# Patient Record
Sex: FEMALE | Race: BLACK OR AFRICAN AMERICAN | NOT HISPANIC OR LATINO | Employment: FULL TIME | URBAN - METROPOLITAN AREA
[De-identification: names, ages, dates, MRNs, and addresses within clinical notes are randomized per-mention and may not be internally consistent; named-entity substitution may affect disease eponyms.]

---

## 2023-10-18 ENCOUNTER — APPOINTMENT (OUTPATIENT)
Dept: CARDIOLOGY | Facility: CLINIC | Age: 63
End: 2023-10-18
Payer: COMMERCIAL

## 2023-10-18 LAB
NON-UH HIE A/G RATIO: 1.3
NON-UH HIE ALB: 3.7 G/DL (ref 3.4–5)
NON-UH HIE ALK PHOS: 76 UNIT/L (ref 45–117)
NON-UH HIE BILIRUBIN, TOTAL: 0.6 MG/DL (ref 0.3–1.2)
NON-UH HIE BUN/CREAT RATIO: 15.5
NON-UH HIE BUN: 17 MG/DL (ref 9–23)
NON-UH HIE CALCIUM: 9.6 MG/DL (ref 8.7–10.4)
NON-UH HIE CALCULATED LDL CHOLESTEROL: 94 MG/DL (ref 60–130)
NON-UH HIE CALCULATED OSMOLALITY: 282 MOSM/KG (ref 275–295)
NON-UH HIE CHLORIDE: 105 MMOL/L (ref 98–107)
NON-UH HIE CHOLESTEROL: 205 MG/DL (ref 100–200)
NON-UH HIE CO2, VENOUS: 31 MMOL/L (ref 20–31)
NON-UH HIE CREATININE: 1.1 MG/DL (ref 0.5–0.8)
NON-UH HIE GFR AA: >60
NON-UH HIE GLOBULIN: 2.8 G/DL
NON-UH HIE GLOMERULAR FILTRATION RATE: 50 ML/MIN/1.73M?
NON-UH HIE GLUCOSE: 85 MG/DL (ref 74–106)
NON-UH HIE GOT: 22 UNIT/L (ref 15–37)
NON-UH HIE GPT: 14 UNIT/L (ref 10–49)
NON-UH HIE HDL CHOLESTEROL: 101 MG/DL (ref 40–60)
NON-UH HIE K: 3.8 MMOL/L (ref 3.5–5.1)
NON-UH HIE NA: 141 MMOL/L (ref 135–145)
NON-UH HIE TOTAL CHOL/HDL CHOL RATIO: 2
NON-UH HIE TOTAL PROTEIN: 6.5 G/DL (ref 5.7–8.2)
NON-UH HIE TRIGLYCERIDES: 49 MG/DL (ref 30–150)

## 2023-10-25 PROBLEM — I10 HYPERTENSION: Status: ACTIVE | Noted: 2023-10-25

## 2023-10-25 PROBLEM — R07.89 ATYPICAL CHEST PAIN: Status: ACTIVE | Noted: 2023-10-25

## 2023-10-25 PROBLEM — I95.1 ORTHOSTATIC HYPOTENSION AFTER EXERCISE: Status: ACTIVE | Noted: 2023-10-25

## 2023-10-25 RX ORDER — VALSARTAN AND HYDROCHLOROTHIAZIDE 80; 12.5 MG/1; MG/1
1 TABLET, FILM COATED ORAL DAILY
COMMUNITY
Start: 2021-08-23 | End: 2023-10-26 | Stop reason: DRUGHIGH

## 2023-10-25 RX ORDER — LEVOTHYROXINE SODIUM 88 UG/1
1 TABLET ORAL DAILY
COMMUNITY
Start: 2022-03-28

## 2023-10-25 RX ORDER — GINGER ROOT/GINGER ROOT EXT 262.5 MG
CAPSULE ORAL
COMMUNITY

## 2023-10-26 ENCOUNTER — OFFICE VISIT (OUTPATIENT)
Dept: CARDIOLOGY | Facility: CLINIC | Age: 63
End: 2023-10-26
Payer: COMMERCIAL

## 2023-10-26 VITALS
WEIGHT: 137 LBS | BODY MASS INDEX: 21.5 KG/M2 | HEART RATE: 72 BPM | TEMPERATURE: 97.4 F | HEIGHT: 67 IN | DIASTOLIC BLOOD PRESSURE: 80 MMHG | SYSTOLIC BLOOD PRESSURE: 120 MMHG

## 2023-10-26 DIAGNOSIS — I10 PRIMARY HYPERTENSION: Primary | ICD-10-CM

## 2023-10-26 PROCEDURE — 3079F DIAST BP 80-89 MM HG: CPT | Performed by: INTERNAL MEDICINE

## 2023-10-26 PROCEDURE — 99213 OFFICE O/P EST LOW 20 MIN: CPT | Performed by: INTERNAL MEDICINE

## 2023-10-26 PROCEDURE — 1036F TOBACCO NON-USER: CPT | Performed by: INTERNAL MEDICINE

## 2023-10-26 PROCEDURE — 3074F SYST BP LT 130 MM HG: CPT | Performed by: INTERNAL MEDICINE

## 2023-10-26 PROCEDURE — 1126F AMNT PAIN NOTED NONE PRSNT: CPT | Performed by: INTERNAL MEDICINE

## 2023-10-26 RX ORDER — VALSARTAN 80 MG/1
80 TABLET ORAL DAILY
Qty: 30 TABLET | Refills: 11 | Status: SHIPPED | OUTPATIENT
Start: 2023-10-26 | End: 2024-10-25

## 2023-10-26 ASSESSMENT — PAIN SCALES - GENERAL: PAINLEVEL: 0-NO PAIN

## 2023-10-26 NOTE — PROGRESS NOTES
1. Hypertension  2. Gastroesophageal reflux  3. Orthostatic lightheadedness, resolved  4. Hypothyroidism    Patient is a pleasant 63-year-old female with the above-noted pertinent past medical history who returns today for follow-up of her hypertension, she is an avid runner, she alludes to occasional episodes of lightheadedness dizziness, she has no complaints of chest pain she is no complaints of orthopnea PND palpitation or syncopal episodes.    Vital signs reviewed and stable, patient is a well-developed well-nourished female in no acute distress speaking full sentences no carotid bruits upstroke and volumes are normal, heart rate and rhythm are regular S1-S2 are normal no gallop or murmurs appreciated lungs are clear to auscultation abdomen is scaphoid positive bowel sounds soft and nontender    October 18, 2023  Sodium 141, potassium 3.8, BUN 17, creatinine 1.1 with normal liver function test, blood glucose level of 85  Total cholesterol 205, triglyceride 49, , and LDL of 94    Hypertension under good control case was discussed with the patient in light of her occasional lightheadedness dizziness that may indicate intermittent episodes of dehydration we will discontinue the hydrochlorothiazide component of her antihypertensive medication patient is provided with a new prescription for valsartan, patient was recommended to continue with the blood pressure monitoring at home, patient is a non-smoker, return to my clinic in 6 months.

## 2023-10-31 ENCOUNTER — TELEPHONE (OUTPATIENT)
Dept: CARDIOLOGY | Facility: CLINIC | Age: 63
End: 2023-10-31
Payer: COMMERCIAL

## 2023-10-31 NOTE — TELEPHONE ENCOUNTER
----- Message from Parish España MD sent at 10/30/2023 12:03 PM EDT -----  Cholesterol profile very good, liver and kidney functions are normal

## 2023-11-16 ENCOUNTER — APPOINTMENT (OUTPATIENT)
Dept: CARDIOLOGY | Facility: CLINIC | Age: 63
End: 2023-11-16
Payer: COMMERCIAL

## 2024-02-07 ENCOUNTER — OFFICE VISIT (OUTPATIENT)
Dept: CARDIOLOGY | Facility: CLINIC | Age: 64
End: 2024-02-07
Payer: COMMERCIAL

## 2024-02-07 VITALS
TEMPERATURE: 96.5 F | HEIGHT: 67 IN | HEART RATE: 56 BPM | DIASTOLIC BLOOD PRESSURE: 80 MMHG | BODY MASS INDEX: 21.03 KG/M2 | SYSTOLIC BLOOD PRESSURE: 140 MMHG | WEIGHT: 134 LBS

## 2024-02-07 DIAGNOSIS — I10 HYPERTENSION, UNSPECIFIED TYPE: Primary | ICD-10-CM

## 2024-02-07 PROCEDURE — 3077F SYST BP >= 140 MM HG: CPT | Performed by: INTERNAL MEDICINE

## 2024-02-07 PROCEDURE — 3079F DIAST BP 80-89 MM HG: CPT | Performed by: INTERNAL MEDICINE

## 2024-02-07 PROCEDURE — 99213 OFFICE O/P EST LOW 20 MIN: CPT | Performed by: INTERNAL MEDICINE

## 2024-02-07 PROCEDURE — 1126F AMNT PAIN NOTED NONE PRSNT: CPT | Performed by: INTERNAL MEDICINE

## 2024-02-07 PROCEDURE — 1036F TOBACCO NON-USER: CPT | Performed by: INTERNAL MEDICINE

## 2024-02-07 ASSESSMENT — PAIN SCALES - GENERAL: PAINLEVEL: 0-NO PAIN

## 2024-02-07 NOTE — PROGRESS NOTES
1. Hypertension  2. Gastroesophageal reflux  3. Orthostatic lightheadedness, resolved  4. Hypothyroidism    Patient is a pleasant 63-year-old female with the above-noted pertinent past medical history presents today for follow-up of her hypertension.  During the last visit she had complaints of orthostatic lightheadedness dizziness she enjoys her running and marathons, she states that she was persistently symptomatic in this light and her hydrochlorothiazide was discontinued she presents today stating that her symptoms has resolved and I have noted to her that her blood pressure today was stage I hypertension, she has no complaints of orthopnea PND palpitation or syncopal episode    Today's BMI at 21  Heart rate of 56 bpm, blood pressure 140/80 mmHg well-developed well-nourished female in no acute distress speaking full sentences no carotid bruits upstroke and volumes are normal, heart rate and rhythm are regular S1-S2 are normal no gallop or murmurs, lungs are clear to auscultation, abdomen is scaphoid positive bowel sounds soft and nontender    Hypertension that required discontinuation of the chlorothiazide due to symptomatic orthostatic lightheadedness she wishes not to be placed back on diuretic, and states that her blood pressures at home are under very good control, however she states that she has noticed that recently passed 5 days due to her job she has not been getting enough rest and in the setting of suboptimal sleep she has tendency for higher blood pressures, patient was recommended to continue with the blood pressure readings at home and to notify the office should the blood pressure remain elevated for further consideration of treatment, she is to return to my clinic in 6 months for follow-up.

## 2024-04-24 ENCOUNTER — APPOINTMENT (OUTPATIENT)
Dept: CARDIOLOGY | Facility: CLINIC | Age: 64
End: 2024-04-24
Payer: COMMERCIAL

## 2024-05-01 ENCOUNTER — OFFICE VISIT (OUTPATIENT)
Dept: CARDIOLOGY | Facility: CLINIC | Age: 64
End: 2024-05-01
Payer: COMMERCIAL

## 2024-05-01 VITALS
HEART RATE: 66 BPM | WEIGHT: 134 LBS | TEMPERATURE: 97.9 F | SYSTOLIC BLOOD PRESSURE: 154 MMHG | BODY MASS INDEX: 21.03 KG/M2 | DIASTOLIC BLOOD PRESSURE: 80 MMHG | HEIGHT: 67 IN

## 2024-05-01 DIAGNOSIS — I10 PRIMARY HYPERTENSION: Primary | ICD-10-CM

## 2024-05-01 PROBLEM — M50.30 DEGENERATIVE DISC DISEASE, CERVICAL: Status: ACTIVE | Noted: 2024-05-01

## 2024-05-01 PROBLEM — M51.34 THORACIC DEGENERATIVE DISC DISEASE: Status: ACTIVE | Noted: 2024-05-01

## 2024-05-01 PROBLEM — M51.36 DEGENERATION OF LUMBAR INTERVERTEBRAL DISC: Status: ACTIVE | Noted: 2024-05-01

## 2024-05-01 PROBLEM — M51.369 DEGENERATION OF LUMBAR INTERVERTEBRAL DISC: Status: ACTIVE | Noted: 2024-05-01

## 2024-05-01 PROCEDURE — 1126F AMNT PAIN NOTED NONE PRSNT: CPT | Performed by: INTERNAL MEDICINE

## 2024-05-01 PROCEDURE — 3079F DIAST BP 80-89 MM HG: CPT | Performed by: INTERNAL MEDICINE

## 2024-05-01 PROCEDURE — 1159F MED LIST DOCD IN RCRD: CPT | Performed by: INTERNAL MEDICINE

## 2024-05-01 PROCEDURE — 3077F SYST BP >= 140 MM HG: CPT | Performed by: INTERNAL MEDICINE

## 2024-05-01 PROCEDURE — 99213 OFFICE O/P EST LOW 20 MIN: CPT | Performed by: INTERNAL MEDICINE

## 2024-05-01 PROCEDURE — 1036F TOBACCO NON-USER: CPT | Performed by: INTERNAL MEDICINE

## 2024-05-01 RX ORDER — AMLODIPINE BESYLATE 2.5 MG/1
2.5 TABLET ORAL DAILY
Qty: 30 TABLET | Refills: 4 | Status: SHIPPED | OUTPATIENT
Start: 2024-05-01 | End: 2025-05-01

## 2024-05-01 ASSESSMENT — PAIN SCALES - GENERAL: PAINLEVEL: 0-NO PAIN

## 2024-05-01 NOTE — PROGRESS NOTES
1. Hypertension  2. Gastroesophageal reflux  3. Orthostatic lightheadedness, resolved  4. Hypothyroidism    Patient is a pleasant 63-year-old female with the above-noted pertinent past medical history who presents today for follow-up of her blood pressure.  She is a marathon runner and during the last visit she had informed me that diuretics are a performance-enhancing medication and she is not allowed to be the diuretics, patient hydrochlorothiazide was stopped and was recommended to continue with her valsartan.  She presents today stating that the blood pressures at home are routinely running above 140/80 mmHg, she has no associated symptoms of orthopnea PND palpitation or syncopal episodes    Vital signs reviewed and moderate hypertension is noted her BMI is acceptable at 21, blood pressure by MA at 154/80 mmHg and heart rate of 66 bpm, blood pressure was repeated by me the left upper extremity 142/72 mmHg, heart rate and rhythm are regular S1-S2 are normal no gallop or murmurs, lungs clear to auscultation    Hypertension with suboptimal control, valsartan of 80 mg daily patient is unable to take diuretics, she is recommended to start amlodipine 2.5 mg daily to continue with the home blood pressure readings and to inform the office in about a week or 2 should the blood pressures continue to be elevated at which time we will recommend increasing her amlodipine to 5 mg daily  Patient is a non-smoker  Patient is to return to my clinic as previously scheduled in 3 months.

## 2024-08-07 ENCOUNTER — APPOINTMENT (OUTPATIENT)
Dept: CARDIOLOGY | Facility: CLINIC | Age: 64
End: 2024-08-07
Payer: COMMERCIAL

## 2024-08-07 VITALS
WEIGHT: 136 LBS | SYSTOLIC BLOOD PRESSURE: 136 MMHG | BODY MASS INDEX: 21.35 KG/M2 | DIASTOLIC BLOOD PRESSURE: 74 MMHG | TEMPERATURE: 96.5 F | HEART RATE: 66 BPM | HEIGHT: 67 IN

## 2024-08-07 DIAGNOSIS — I10 PRIMARY HYPERTENSION: Primary | ICD-10-CM

## 2024-08-07 PROCEDURE — 3075F SYST BP GE 130 - 139MM HG: CPT | Performed by: INTERNAL MEDICINE

## 2024-08-07 PROCEDURE — 99213 OFFICE O/P EST LOW 20 MIN: CPT | Performed by: INTERNAL MEDICINE

## 2024-08-07 PROCEDURE — 1159F MED LIST DOCD IN RCRD: CPT | Performed by: INTERNAL MEDICINE

## 2024-08-07 PROCEDURE — 1036F TOBACCO NON-USER: CPT | Performed by: INTERNAL MEDICINE

## 2024-08-07 PROCEDURE — 3078F DIAST BP <80 MM HG: CPT | Performed by: INTERNAL MEDICINE

## 2024-08-07 PROCEDURE — 3008F BODY MASS INDEX DOCD: CPT | Performed by: INTERNAL MEDICINE

## 2024-08-07 PROCEDURE — 1126F AMNT PAIN NOTED NONE PRSNT: CPT | Performed by: INTERNAL MEDICINE

## 2024-08-07 ASSESSMENT — PAIN SCALES - GENERAL: PAINLEVEL: 0-NO PAIN

## 2024-08-07 NOTE — PROGRESS NOTES
1. Hypertension  2. Gastroesophageal reflux  3. Orthostatic lightheadedness, resolved  4. Hypothyroidism    Patient is a pleasant 63-year-old female with the above-noted pertinent past medical history who presents today for follow-up.  She states that she is doing very well with her medication, there is no side effects of lightheadedness dizziness, she has remained compliant with her diet, she continues to participate in marathons running without difficulty she has no complaints of chest pain shortness of breath orthopnea PND.    BMI 21.3, blood pressure 136/74 mmHg and a heart rate of 66 bpm patient is a well-developed well-nourished female in no acute distress speaking full sentences no carotid bruits heart rate and rhythm are regular S1-S2 normal intensity no gallop murmurs or rubs appreciated lungs clear to auscultation abdomen is scaphoid positive bowel sounds soft and nontender    10/2023  Total cholesterol 205, triglyceride 49, , and LDL of 94  Sodium 141, potassium 3.4, BUN 17, creatinine 1.1    63-year-old female with essential hypertension on valsartan and amlodipine she is tolerating the medication well with no side effects continue with current regimen is recommended  Recent blood work was discussed  Patient is to return to cardiology in 1 year for follow-up.

## 2024-09-19 DIAGNOSIS — I10 PRIMARY HYPERTENSION: ICD-10-CM

## 2024-09-19 RX ORDER — AMLODIPINE BESYLATE 2.5 MG/1
2.5 TABLET ORAL DAILY
Qty: 90 TABLET | Refills: 3 | Status: SHIPPED | OUTPATIENT
Start: 2024-09-19 | End: 2025-09-19

## 2024-10-29 DIAGNOSIS — I10 PRIMARY HYPERTENSION: ICD-10-CM

## 2024-10-29 RX ORDER — VALSARTAN 80 MG/1
80 TABLET ORAL DAILY
Qty: 90 TABLET | Refills: 3 | Status: SHIPPED | OUTPATIENT
Start: 2024-10-29 | End: 2025-10-29

## 2025-04-04 LAB
NON-UH HIE FREE T4: 1.18 NG/DL (ref 0.89–1.76)
NON-UH HIE HGB A1C: 5.8 %
NON-UH HIE TSH: 2.39 UIU/ML (ref 0.55–4.78)

## 2025-08-07 ENCOUNTER — APPOINTMENT (OUTPATIENT)
Dept: CARDIOLOGY | Facility: CLINIC | Age: 65
End: 2025-08-07
Payer: COMMERCIAL

## 2025-08-08 ENCOUNTER — OFFICE VISIT (OUTPATIENT)
Dept: CARDIOLOGY | Facility: CLINIC | Age: 65
End: 2025-08-08
Payer: MEDICARE

## 2025-08-08 VITALS
HEART RATE: 67 BPM | BODY MASS INDEX: 21.94 KG/M2 | WEIGHT: 139.8 LBS | DIASTOLIC BLOOD PRESSURE: 78 MMHG | HEIGHT: 67 IN | SYSTOLIC BLOOD PRESSURE: 148 MMHG

## 2025-08-08 DIAGNOSIS — R07.89 ATYPICAL CHEST PAIN: ICD-10-CM

## 2025-08-08 DIAGNOSIS — R94.31 ABNORMAL ELECTROCARDIOGRAM (ECG) (EKG): ICD-10-CM

## 2025-08-08 DIAGNOSIS — I10 PRIMARY HYPERTENSION: Primary | ICD-10-CM

## 2025-08-08 PROBLEM — E03.9 HYPOTHYROID: Status: ACTIVE | Noted: 2025-08-08

## 2025-08-08 ASSESSMENT — ENCOUNTER SYMPTOMS
CARDIOVASCULAR NEGATIVE: 1
NEUROLOGICAL NEGATIVE: 1
MUSCULOSKELETAL NEGATIVE: 1
RESPIRATORY NEGATIVE: 1
PSYCHIATRIC NEGATIVE: 1
GASTROINTESTINAL NEGATIVE: 1

## 2025-08-08 NOTE — PROGRESS NOTES
Patient:  Almita House  YOB: 1960  MRN: [id number]       Chief Complaint/Active Symptoms:       Almita House is a 64 y.o. female who returns today for cardiac follow-up HTN    No problems since she was seen a year ago. No hospitalizations or procedures (colonoscopy 12/2024).     No problems or concerns. No chest pain or discomfort. No shortness of breath, orthopnea or PND. No edema. No palpitations, no syncope or lightheadedness.     Review of Systems   HENT: Negative.     Respiratory: Negative.     Cardiovascular: Negative.    Gastrointestinal: Negative.    Genitourinary: Negative.    Musculoskeletal: Negative.    Neurological: Negative.    Psychiatric/Behavioral: Negative.     All other systems reviewed and are negative.      Objective:     Vitals:    08/08/25 0926   BP: 148/78   Pulse: 67       Vitals:    08/08/25 0926   Weight: 63.4 kg (139 lb 12.8 oz)       Allergies:     Allergies[1]       Medications:     Current Outpatient Medications   Medication Instructions    amLODIPine (NORVASC) 2.5 mg, oral, Daily    levothyroxine (Synthroid) 88 mcg tablet 1 tablet, Daily    valsartan (DIOVAN) 80 mg, oral, Daily       Physical Examination:   GENERAL:  Well developed, well nourished, in no acute distress.  HEENT: NC AT, EOMI with anicteric sclera  NECK:  Supple, no JVD, no bruit.  CHEST:  Symmetric and nontender.  LUNGS:  Clear to auscultation bilaterally, normal respiratory effort.  HEART:  PMI is nondisplaced. RRR with normal S1 and S2, no S3, no mumur or rub. No carotid or abdominal bruits  ABDOMEN: Soft, NT, ND without palpable organomegaly or bruits  EXTREMITIES:  Warm with good color, no clubbing or cyanosis.  There is no edema noted.  PERIPHERAL VASCULAR:  Pulses present and equally palpable; 2+ throughout.  MUSCULOSKELETAL: No significant joint or limb deformity  NEURO/PSYCH:  Alert and oriented times three with approppriate behavior and responses. Nonfocal motor examination with normal gait and  "ambulation  Lymph: No significant palpable lymphadenopathy  Skin: no rash or lesions on exposed skin or reported.    Lab:     CBC:   No results found for: \"WBC\", \"RBC\", \"HGB\", \"HCT\", \"PLT\"     CMP:    No results found for: \"NA\", \"K\", \"CL\", \"CO2\", \"BUN\", \"CREATININE\", \"AGRATIO\", \"GLUCOSE\", \"GLU\", \"CALCIUM\"    Magnesium:    No results found for: \"MG\"    Lipid Profile:    No results found for: \"CHLPL\", \"TRIG\", \"HDL\", \"LDLCALC\", \"LDLDIRECT\"    TSH:    No results found for: \"TSH\"    BNP:   No results found for: \"BNP\"     PT/INR:    No results found for: \"PROTIME\", \"INR\"    HgBA1c:    No results found for: \"HGBA1C\"    BMP:  No results found for: \"NA\", \"K\", \"CL\", \"CO2\", \"BUN\", \"CREATININE\", \"GLU\"    Cardiac Enzymes:    No results found for: \"TROPHS\"    Hepatic Function Panel:    No results found for: \"ALKPHOS\", \"ALT\", \"AST\", \"PROT\", \"BILITOT\", \"BILIDIR\"    No labs since 2021 available for review old labs from 2023 reviewed from [facility].  We have requested more recent labs from her [facility] primary care physician    Diagnostic Studies:     EKG:   EKG today NSR, NS T abn.    Radiology:     CT cardiac scoring wo IV contrast    (Results Pending)       ASSESSMENT     Problem List Items Addressed This Visit       RESOLVED: Atypical chest pain    Hypertension - Primary    Relevant Orders    ECG 12 lead (Clinic Performed) (Completed)    CT cardiac scoring wo IV contrast    Abnormal electrocardiogram (ECG) (EKG)       PLAN   Patient's blood pressure is well-controlled and she has no signs or symptoms of heart disease at this time.  I have recommended a CT cardiac score and we have requested her labs from her primary care physician hopefully to include a lipid panel.  If her CT cardiac score is not high risk we will continue to follow her on an annual basis.  If the CT cardiac score is greater than 100 would recommend statin therapy with a dose and statin to be determined by her lipid panel.    We have encouraged " her to continue to be active and return for an earlier appointment if she has any problems with any chest discomfort or shortness of breath.  Otherwise we will see her in follow-up in a year's time.                     [1]   Allergies  Allergen Reactions    Amoxicillin-Pot Clavulanate Itching and Unknown    Lemon Juice Unknown    Tetracycline Unknown

## 2025-09-30 ENCOUNTER — APPOINTMENT (OUTPATIENT)
Dept: RADIOLOGY | Facility: CLINIC | Age: 65
End: 2025-09-30
Payer: MEDICARE

## 2026-08-06 ENCOUNTER — APPOINTMENT (OUTPATIENT)
Dept: CARDIOLOGY | Facility: CLINIC | Age: 66
End: 2026-08-06
Payer: MEDICARE